# Patient Record
Sex: FEMALE
[De-identification: names, ages, dates, MRNs, and addresses within clinical notes are randomized per-mention and may not be internally consistent; named-entity substitution may affect disease eponyms.]

---

## 2023-01-11 DIAGNOSIS — M25.561 PAIN IN RIGHT KNEE: ICD-10-CM

## 2023-01-11 DIAGNOSIS — M17.11 UNILATERAL PRIMARY OSTEOARTHRITIS, RIGHT KNEE: ICD-10-CM

## 2023-01-11 DIAGNOSIS — G89.29 PAIN IN RIGHT KNEE: ICD-10-CM

## 2023-01-11 PROBLEM — Z00.00 ENCOUNTER FOR PREVENTIVE HEALTH EXAMINATION: Status: ACTIVE | Noted: 2023-01-11

## 2023-01-12 ENCOUNTER — APPOINTMENT (OUTPATIENT)
Dept: RADIOLOGY | Facility: CLINIC | Age: 63
End: 2023-01-12

## 2023-01-12 ENCOUNTER — RESULT REVIEW (OUTPATIENT)
Age: 63
End: 2023-01-12

## 2023-01-12 ENCOUNTER — OUTPATIENT (OUTPATIENT)
Dept: OUTPATIENT SERVICES | Facility: HOSPITAL | Age: 63
LOS: 1 days | End: 2023-01-12
Payer: COMMERCIAL

## 2023-01-12 ENCOUNTER — TRANSCRIPTION ENCOUNTER (OUTPATIENT)
Age: 63
End: 2023-01-12

## 2023-01-12 ENCOUNTER — APPOINTMENT (OUTPATIENT)
Dept: ORTHOPEDIC SURGERY | Facility: CLINIC | Age: 63
End: 2023-01-12
Payer: COMMERCIAL

## 2023-01-12 ENCOUNTER — APPOINTMENT (OUTPATIENT)
Dept: MRI IMAGING | Facility: CLINIC | Age: 63
End: 2023-01-12

## 2023-01-12 DIAGNOSIS — Z82.3 FAMILY HISTORY OF STROKE: ICD-10-CM

## 2023-01-12 DIAGNOSIS — Z82.61 FAMILY HISTORY OF ARTHRITIS: ICD-10-CM

## 2023-01-12 DIAGNOSIS — Z82.69 FAMILY HISTORY OF OTHER DISEASES OF THE MUSCULOSKELETAL SYSTEM AND CONNECTIVE TISSUE: ICD-10-CM

## 2023-01-12 DIAGNOSIS — Z82.49 FAMILY HISTORY OF ISCHEMIC HEART DISEASE AND OTHER DISEASES OF THE CIRCULATORY SYSTEM: ICD-10-CM

## 2023-01-12 DIAGNOSIS — Z78.9 OTHER SPECIFIED HEALTH STATUS: ICD-10-CM

## 2023-01-12 DIAGNOSIS — Z82.62 FAMILY HISTORY OF OSTEOPOROSIS: ICD-10-CM

## 2023-01-12 PROCEDURE — 73564 X-RAY EXAM KNEE 4 OR MORE: CPT | Mod: 26,50,59

## 2023-01-12 PROCEDURE — 73721 MRI JNT OF LWR EXTRE W/O DYE: CPT | Mod: 26,LT

## 2023-01-12 PROCEDURE — 77073 BONE LENGTH STUDIES: CPT | Mod: 26

## 2023-01-12 PROCEDURE — 99205 OFFICE O/P NEW HI 60 MIN: CPT

## 2023-01-13 PROBLEM — Z82.3 FAMILY HISTORY OF CEREBROVASCULAR ACCIDENT (CVA): Status: ACTIVE | Noted: 2023-01-12

## 2023-01-13 PROBLEM — Z82.69 FAMILY HISTORY OF OSTEOARTHRITIS: Status: ACTIVE | Noted: 2023-01-12

## 2023-01-13 PROBLEM — Z82.49 FAMILY HISTORY OF HYPERTENSION: Status: ACTIVE | Noted: 2023-01-12

## 2023-01-13 PROBLEM — Z82.61 FAMILY HISTORY OF RHEUMATOID ARTHRITIS: Status: ACTIVE | Noted: 2023-01-12

## 2023-01-13 PROBLEM — Z82.62 FAMILY HISTORY OF OSTEOPOROSIS: Status: ACTIVE | Noted: 2023-01-12

## 2023-01-13 PROBLEM — Z78.9 NON-SMOKER: Status: ACTIVE | Noted: 2023-01-12

## 2023-01-13 PROBLEM — Z82.49 FAMILY HISTORY OF CARDIAC DISORDER: Status: ACTIVE | Noted: 2023-01-12

## 2023-01-13 NOTE — PHYSICAL EXAM
[de-identified] : Left Knee Exam: \par \par Skin: Normal. No erythema, no ecchymosis, no abrasions, no scratches, no tattoos.  \par                 \par Old Incisions: Healed arthroscopy portals. \par \par Knee Joint Swelling: Positive effusion. Mild to Moderate\par \par Popliteal Swelling: None	\par \par Pre-Patella Bursa Swelling: None. \par \par Alignment: 		        \par Varus, Mild Moderate\par Passively correctable to near neutral.  \par \par Knee Joint Line Tenderness: \par Tender at medial joint line. \par Tender at the lateral joint line.   \par Not tender in the patellofemoral compartment. \par \par Soft Tissue Tenderness: None. \par \par Medial Collateral Ligament Laxity:  Good endpoint.                                                       \par Medial opening to valgus stress.   Moderate.    \par \par Lateral Collateral Ligament Laxity:          \par Normal laxity. Good endpoint. \par \par ROM Extension: 0 degrees.   \par ROM Flexion: 115 degrees.   \par \par ACL Testing: 			\par Stable ACL. Good Endpoint.                                                                \par Lachman Test: Negative \par Anterior Drawer Test: Negative \par \par PCL Testing: 			\par Stable PCL.  Good Endpoint.                                                               \par Posterior Drawer Test: Negative \par \par Patella Compression Test: Negative \par \par Patellofemoral Crepitus: None.   \par \par Patellofemoral Apprehension Testing: Negative. \par \par Patellofemoral Laxity: Normal.\par \par Motor Strength: 			\par Quadriceps strength is 5 out of 5                                                                \par Hamstring strength is 5 out of 5                                                               \par Ankle dorsiflexion strength is 5 out of 5                                                              \par Ankle plantarflexion strength is 5 out of 5 \par \par Sensation: Light tough sensation in the lower extremity is grossly normal.  \par                                    \par Pulses: 				\par Pulses are palpable at the ankle at the Dorsalis Pedis Artery.                                                               \par Pulses are palpable at the Posterior Tibialis Artery.                                                                                                                   \par \par Hip Motion: The patient has full and painless hip range of motion on the left hip. The right hip is painful with motion due to her abductor repair and labral repair surgeries.                                                                                                                 \par \par Lumbar Spine Symptoms: Negative straight leg raise.                                                         \par \par Gait: Limping Gait.  Abnormal gait.  \par \par Assistive Devices: 	Cane in the left hand\par \par \par  [de-identified] : X-ray of the Left Knee:\par \par AP Standing View:\par Medial joint space loss. Severe. Bone on Bone.\par Lateral joint space preserved.\par \par PA Flexion View:\par Medial joint space loss. mild.\par Lateral joint space preserved.\par \par \par Lateral View: \par Patellofemoral joint space preserved.\par \par [place] View: \par Patellofemoral joint space is preserved.\par Patellofemoral joint central tracking.\par \par Bilateral Hip to Ankle Standing View:\par Alignment: Varus left.\par Medial joint space loss on left.\par Hips: No significant changes but there is some sign of work done on the right greater trochanter with no metal but possibly radiolucent suture anchors or bone tunnels.\par

## 2023-01-13 NOTE — DISCUSSION/SUMMARY
[de-identified] : Assessment:\par \par Left Knee:\par -Left knee pain medially\par -Left knee moderate to severe medial compartment osteoarthritis on the AP view, not the PA flexion view.\par -X-rays show moderate to severe bone on bone medial compartment joint space loss in left knee\par -Intact ACL, PCL, mild laxity of MCL\par -No patellofemoral pain or \par - The lateral compartment is tender to touch but not a true Yissel test.\par -Failure of non surgical management for osteoarthritis of the knee.\par -Excellent candidate for Medial Partial Knee Replacement if no tear of the lateral meniscus.\par -patient tripped over a friends dog recently and the lateral pain has been there since, will need to MRI and ensure no lateral meniscus tear is present.\par \par \par \par Plan: \par \par - The patient would like to have a Partial Knee Replacement as soon as possible due to her chronic pain.\par - I recommended and ordered an MRI to make sure the lateral meniscus and cartilage is okay prior to confirming surgery.\par - If not lateral meniscus tear is found, we will proceed with partial knee replacement of the left knee.\par - We have provided our information packet to take home and discuss with the family.\par - The procedure would be Michael Robotic-Arm Assisted, with cemented Restoris MCK implants from [employer].\par - The side, Left \par - Compartment, Medial \par - The patient is discussing surgery scheduling with our coordinator\par \par \par Discussion Partial Knee Replacement:\par \par I had a discussion with the patient regarding the findings of their history, exam and imaging. We discussed the option of Partial Knee Replacement using the precision Michael™ Robotic-Arm System. We discussed the indications, surgical process, my techniques of surgery, the probable post-operative course and instructions, and the risks and benefits of the procedure. The patient had their questions answered to their satisfaction. Although there are no guarantees to success, I feel that the surgery would be very beneficial and there is an excellent chance for a positive outcome.\par \par The risks of this procedure include but are not limited to the risks of anesthesia, heart attack, stroke, respiratory complications, wound healing problems, skin blisters, delayed wound healing, infection, bleeding, nerve damage, vessel damage, skin sensory changes next to the incisions, loss of motion, scar tissue formation requiring further treatment, blood clots, heterotopic bone formation, implant wear, implant loosening after surgery, allergic responses to the implant materials, continued pain despite proper implant placement, soft tissue pain, continued knee swelling, with partial knee replacement there can be progression of arthritis into the other compartments requiring future surgery, and the possible need for further surgery in the future for scar tissue removal, plastic insert change over time, addition of other compartment replacements, and possible revision to total knee replacement.\par \par I look forward to the opportunity to help this patient with their painful knee condition.

## 2023-01-23 DIAGNOSIS — Z01.812 ENCOUNTER FOR PREPROCEDURAL LABORATORY EXAMINATION: ICD-10-CM

## 2023-02-01 ENCOUNTER — APPOINTMENT (OUTPATIENT)
Dept: CT IMAGING | Facility: CLINIC | Age: 63
End: 2023-02-01

## 2023-02-01 ENCOUNTER — OUTPATIENT (OUTPATIENT)
Dept: OUTPATIENT SERVICES | Facility: HOSPITAL | Age: 63
LOS: 1 days | End: 2023-02-01
Payer: COMMERCIAL

## 2023-02-01 ENCOUNTER — LABORATORY RESULT (OUTPATIENT)
Age: 63
End: 2023-02-01

## 2023-02-01 ENCOUNTER — APPOINTMENT (OUTPATIENT)
Dept: ORTHOPEDIC SURGERY | Facility: CLINIC | Age: 63
End: 2023-02-01
Payer: COMMERCIAL

## 2023-02-01 VITALS — WEIGHT: 136 LBS | HEIGHT: 64 IN | BODY MASS INDEX: 23.22 KG/M2

## 2023-02-01 DIAGNOSIS — Z87.19 PERSONAL HISTORY OF OTHER DISEASES OF THE DIGESTIVE SYSTEM: ICD-10-CM

## 2023-02-01 DIAGNOSIS — Z85.3 PERSONAL HISTORY OF MALIGNANT NEOPLASM OF BREAST: ICD-10-CM

## 2023-02-01 DIAGNOSIS — M17.12 UNILATERAL PRIMARY OSTEOARTHRITIS, LEFT KNEE: ICD-10-CM

## 2023-02-01 DIAGNOSIS — Z87.39 PERSONAL HISTORY OF OTHER DISEASES OF THE MUSCULOSKELETAL SYSTEM AND CONNECTIVE TISSUE: ICD-10-CM

## 2023-02-01 PROCEDURE — 99214 OFFICE O/P EST MOD 30 MIN: CPT

## 2023-02-01 PROCEDURE — 73700 CT LOWER EXTREMITY W/O DYE: CPT | Mod: 26,LT

## 2023-02-01 PROCEDURE — 73700 CT LOWER EXTREMITY W/O DYE: CPT

## 2023-02-02 LAB
25(OH)D3 SERPL-MCNC: 33.3 NG/ML
ALBUMIN SERPL ELPH-MCNC: 4.4 G/DL
ALP BLD-CCNC: 78 U/L
ALT SERPL-CCNC: 26 U/L
ANION GAP SERPL CALC-SCNC: 10 MMOL/L
APPEARANCE: CLEAR
APTT BLD: 32.7 SEC
AST SERPL-CCNC: 23 U/L
BACTERIA: NEGATIVE
BASOPHILS # BLD AUTO: 0.04 K/UL
BASOPHILS NFR BLD AUTO: 0.7 %
BILIRUB SERPL-MCNC: 0.3 MG/DL
BILIRUBIN URINE: NEGATIVE
BLOOD URINE: NEGATIVE
BUN SERPL-MCNC: 17 MG/DL
CALCIUM SERPL-MCNC: 10 MG/DL
CHLORIDE SERPL-SCNC: 106 MMOL/L
CO2 SERPL-SCNC: 26 MMOL/L
COLOR: COLORLESS
CREAT SERPL-MCNC: 0.85 MG/DL
EGFR: 77 ML/MIN/1.73M2
EOSINOPHIL # BLD AUTO: 0.23 K/UL
EOSINOPHIL NFR BLD AUTO: 4.2 %
FERRITIN SERPL-MCNC: 154 NG/ML
GLUCOSE QUALITATIVE U: NEGATIVE
GLUCOSE SERPL-MCNC: 95 MG/DL
HCG SERPL-MCNC: 3 MIU/ML
HCT VFR BLD CALC: 42.4 %
HGB BLD-MCNC: 13.7 G/DL
HYALINE CASTS: 0 /LPF
IMM GRANULOCYTES NFR BLD AUTO: 0.2 %
INR PPP: 0.94 RATIO
IRON SATN MFR SERPL: 16 %
IRON SERPL-MCNC: 48 UG/DL
KETONES URINE: NEGATIVE
LEUKOCYTE ESTERASE URINE: NEGATIVE
LYMPHOCYTES # BLD AUTO: 1.87 K/UL
LYMPHOCYTES NFR BLD AUTO: 34 %
MAN DIFF?: NORMAL
MCHC RBC-ENTMCNC: 29.1 PG
MCHC RBC-ENTMCNC: 32.3 GM/DL
MCV RBC AUTO: 90.2 FL
MICROSCOPIC-UA: NORMAL
MONOCYTES # BLD AUTO: 0.37 K/UL
MONOCYTES NFR BLD AUTO: 6.7 %
MRSA SPEC QL CULT: NOT DETECTED
NEUTROPHILS # BLD AUTO: 2.98 K/UL
NEUTROPHILS NFR BLD AUTO: 54.2 %
NITRITE URINE: NEGATIVE
PH URINE: 5.5
PLATELET # BLD AUTO: 399 K/UL
POTASSIUM SERPL-SCNC: 4.5 MMOL/L
PROT SERPL-MCNC: 6.8 G/DL
PROTEIN URINE: NEGATIVE
PT BLD: 11.1 SEC
RBC # BLD: 4.7 M/UL
RBC # FLD: 12.7 %
RED BLOOD CELLS URINE: 0 /HPF
SODIUM SERPL-SCNC: 142 MMOL/L
SPECIFIC GRAVITY URINE: 1
SQUAMOUS EPITHELIAL CELLS: 0 /HPF
STAPH AUREUS (SA): NOT DETECTED
TIBC SERPL-MCNC: 296 UG/DL
TRANSFERRIN SERPL-MCNC: 239 MG/DL
UIBC SERPL-MCNC: 248 UG/DL
UROBILINOGEN URINE: NORMAL
WBC # FLD AUTO: 5.5 K/UL
WHITE BLOOD CELLS URINE: 1 /HPF

## 2023-02-03 LAB — SARS-COV-2 N GENE NPH QL NAA+PROBE: NOT DETECTED

## 2023-02-07 ENCOUNTER — APPOINTMENT (OUTPATIENT)
Age: 63
End: 2023-02-07
Payer: COMMERCIAL

## 2023-02-07 PROCEDURE — 27446 REVISION OF KNEE JOINT: CPT | Mod: LT

## 2023-02-08 ENCOUNTER — APPOINTMENT (OUTPATIENT)
Dept: ORTHOPEDIC SURGERY | Facility: CLINIC | Age: 63
End: 2023-02-08
Payer: COMMERCIAL

## 2023-02-08 ENCOUNTER — RESULT REVIEW (OUTPATIENT)
Age: 63
End: 2023-02-08

## 2023-02-08 ENCOUNTER — OUTPATIENT (OUTPATIENT)
Dept: OUTPATIENT SERVICES | Facility: HOSPITAL | Age: 63
LOS: 1 days | End: 2023-02-08
Payer: COMMERCIAL

## 2023-02-08 VITALS — WEIGHT: 136 LBS | HEIGHT: 64 IN | BODY MASS INDEX: 23.22 KG/M2

## 2023-02-08 PROCEDURE — 73562 X-RAY EXAM OF KNEE 3: CPT | Mod: 26,LT

## 2023-02-08 PROCEDURE — 77073 BONE LENGTH STUDIES: CPT | Mod: 26

## 2023-02-08 PROCEDURE — 73562 X-RAY EXAM OF KNEE 3: CPT

## 2023-02-08 PROCEDURE — 99024 POSTOP FOLLOW-UP VISIT: CPT

## 2023-02-08 PROCEDURE — 77073 BONE LENGTH STUDIES: CPT

## 2023-03-01 ENCOUNTER — APPOINTMENT (OUTPATIENT)
Dept: ORTHOPEDIC SURGERY | Facility: CLINIC | Age: 63
End: 2023-03-01
Payer: COMMERCIAL

## 2023-03-01 VITALS — HEIGHT: 64 IN | BODY MASS INDEX: 23.22 KG/M2 | WEIGHT: 136 LBS

## 2023-03-01 DIAGNOSIS — G89.29 PAIN IN LEFT KNEE: ICD-10-CM

## 2023-03-01 DIAGNOSIS — M54.42 LUMBAGO WITH SCIATICA, LEFT SIDE: ICD-10-CM

## 2023-03-01 DIAGNOSIS — M54.32 SCIATICA, LEFT SIDE: ICD-10-CM

## 2023-03-01 DIAGNOSIS — S83.412A SPRAIN OF MEDIAL COLLATERAL LIGAMENT OF LEFT KNEE, INITIAL ENCOUNTER: ICD-10-CM

## 2023-03-01 DIAGNOSIS — M70.61 TROCHANTERIC BURSITIS, RIGHT HIP: ICD-10-CM

## 2023-03-01 DIAGNOSIS — M70.62 TROCHANTERIC BURSITIS, RIGHT HIP: ICD-10-CM

## 2023-03-01 DIAGNOSIS — M25.562 PAIN IN LEFT KNEE: ICD-10-CM

## 2023-03-01 PROCEDURE — 99213 OFFICE O/P EST LOW 20 MIN: CPT | Mod: 24

## 2023-03-01 RX ORDER — LIDOCAINE 5% 700 MG/1
5 PATCH TOPICAL
Qty: 30 | Refills: 1 | Status: ACTIVE | COMMUNITY
Start: 2023-03-01 | End: 1900-01-01

## 2023-03-01 RX ORDER — CEPHALEXIN 500 MG/1
500 CAPSULE ORAL 4 TIMES DAILY
Qty: 20 | Refills: 1 | Status: COMPLETED | COMMUNITY
Start: 2023-02-06 | End: 2023-02-12

## 2023-03-01 RX ADMIN — METHYLPREDNISOLONE 21 MG: 4 TABLET ORAL at 00:00

## 2023-03-01 NOTE — HISTORY OF PRESENT ILLNESS
[de-identified] : Angelic is 3 weeks s/p left medial partial knee replacement.\par She is complaining of left sided buttock and left lower extremity pain, low back pain, bilateral trochanteric bursitis, decreased sensation in the medial lower leg and front of ankle, burning pain on the lateral thigh, and pain along the MCL of the left knee. [de-identified] : The left knee incisions are healing nicely. No erythema, no ecchymosis, no joint swelling, no calf tenderness. \par The MCL and LCL are stable to stress.\par The ACL and PCL have good endpoints.\par The ROM is 0-110 today.\par There is no leg swelling.\par There is severe bilateral trochanteric bursa tenderness. \par There is lateral thigh sharp and burning pain.\par There is left uppper buttock pain.\par There is low back pain.\par Ther is numbness over the medial lower leg not the medial thigh.\par Pulses are palpable.\par motor exam: 5/5 knee and ankle flexion and extension.\par She is walking with a cane. [de-identified] : -3 weeks s/p left medial partial knee replacement.\par -low back pain with left lower extremity radiculopathy.\par -MCL tenderness\par -bilateral trochanteric bursitis severe.\par  [de-identified] : -I have prescribed a medrol dose pack to help with her global inflammation and to help with her left leg radiculopathy symptoms.\par -I have ordered an MRI of her LS spine to look for a nerve impingement or disc herniation that may be causing this acute flare.\par -She will be caring for her daughters 2 month old child for a while and recoomended using a lumbar support brace during the day to help her back stability.\par She should continue her left knee PT.\par I will followup with her on the MRI once completed and see her back in 2 weeks.

## 2023-03-06 ENCOUNTER — APPOINTMENT (OUTPATIENT)
Dept: ORTHOPEDIC SURGERY | Facility: CLINIC | Age: 63
End: 2023-03-06
Payer: COMMERCIAL

## 2023-03-06 ENCOUNTER — OUTPATIENT (OUTPATIENT)
Dept: OUTPATIENT SERVICES | Facility: HOSPITAL | Age: 63
LOS: 1 days | End: 2023-03-06
Payer: COMMERCIAL

## 2023-03-06 ENCOUNTER — RESULT REVIEW (OUTPATIENT)
Age: 63
End: 2023-03-06

## 2023-03-06 VITALS
BODY MASS INDEX: 23.22 KG/M2 | DIASTOLIC BLOOD PRESSURE: 95 MMHG | WEIGHT: 136 LBS | SYSTOLIC BLOOD PRESSURE: 172 MMHG | OXYGEN SATURATION: 96 % | HEART RATE: 69 BPM | HEIGHT: 64 IN

## 2023-03-06 DIAGNOSIS — M41.9 SCOLIOSIS, UNSPECIFIED: ICD-10-CM

## 2023-03-06 DIAGNOSIS — M25.562 PAIN IN LEFT KNEE: ICD-10-CM

## 2023-03-06 PROCEDURE — 99215 OFFICE O/P EST HI 40 MIN: CPT | Mod: 24

## 2023-03-06 PROCEDURE — 72110 X-RAY EXAM L-2 SPINE 4/>VWS: CPT

## 2023-03-06 PROCEDURE — 72110 X-RAY EXAM L-2 SPINE 4/>VWS: CPT | Mod: 26

## 2023-03-07 PROBLEM — M25.562 LEFT MEDIAL KNEE PAIN: Status: ACTIVE | Noted: 2023-01-13

## 2023-03-07 PROBLEM — M41.9 LUMBAR SCOLIOSIS: Status: ACTIVE | Noted: 2023-03-07

## 2023-03-08 NOTE — HISTORY OF PRESENT ILLNESS
[de-identified] : 62-year-old female presents as new patient referred by Dr. Alaniz for evaluation of low back and left lower extremity pain.  4 weeks ago she underwent a unicompartmental arthroplasty of the left knee with Dr. Alaniz and states that since the procedure has had left-sided back pain and severe left knee pain.  She denies any clear radiation from the lumbar spine into the lower extremity though states it is difficult to say given recent surgery.  She has tried anti-inflammatories as well as oxycodone but cannot seem to find a balance between side effects and pain relief.  Describes some numbness in the anterior shin since knee surgery, denies any radiation of numbness tingling or weakness into the toes.  Denies any right-sided radicular symptoms.  Has a history of multiple right hip surgeries including arthroscopic labral repair as well as an open right abductor repair at [facility].  Denies any history of low back pain or radicular symptoms.

## 2023-03-08 NOTE — PHYSICAL EXAM
[Antalgic] : antalgic [Cane] : ambulates with cane [UE/LE] : Sensory: Intact in bilateral upper & lower extremities [Normal Touch] : sensation intact for touch [Normal] : No costovertebral angle tenderness and no spinal tenderness [de-identified] : Antalgic favoring left knee [de-identified] : X-ray 4 view lumbar 3/6/2023 care stream:\par Levoscoliosis of the lumbar spine approximately 21 degrees between L4 and T12.  There is a mild rotational deformity associated.  Physiologic maintenance of active flexion extension.  No evidence of instability or disc base degeneration.\par \par MRI lumbar spine 3/3/2023 [facility]:\par Disc base heights are globally maintained.  There is minimal loss of signal at L3-4 L4-5 and L5-S1.  At L5-S1 there is a minimal central disc bulge without foraminal or central stenosis.  At L4-5 there is a left-sided lateral disc bulge resulting in some minor lateral recess and foraminal stenosis which may be impinging the exiting Left L4 nerve root.  The supra adjacent levels are without disc pathology nor stenosis.

## 2023-03-08 NOTE — DISCUSSION/SUMMARY
[de-identified] : Thorough conversation was held the patient regarding her condition and treatment options risk benefits and alternatives.  I have explained that the predominance of her pain does appear to be resulting from the left knee; her procedure appears to have gone very well with appropriate healing and her pain does seem somewhat disproportionate given the time since this procedure.  Her acute left-sided low back pain since waking from the procedure may be secondary to a positional strain or injury.  Given the extent of her discomfort have recommended epidural steroid injection at the L4-5 level.  There is a small left-sided disc bulge in the setting of her scoliosis which does appear chronic in without significant stenosis however in this acute phase may be resulting in a left lower extremity radiculopathy concomitant with her postoperative left knee pain.  I also prescribed tramadol for relief due to the extent of her discomfort\par \par I have spent greater than 60 minutes preparing to see the patient, collecting relevant history, performing a thorough history and physical examination, counseling the patient regarding my findings ordering the appropriate therapies and tests, communicating with other relevant healthcare professionals, documenting my encounter and coordinating care.\par

## 2023-03-08 NOTE — ASSESSMENT
[FreeTextEntry1] : 62-year-old female 4 weeks status post left unicompartmental knee replacement with acute left-sided low back and knee pain with anterior shin numbness since the procedure

## 2023-03-20 ENCOUNTER — APPOINTMENT (OUTPATIENT)
Dept: ORTHOPEDIC SURGERY | Facility: CLINIC | Age: 63
End: 2023-03-20
Payer: COMMERCIAL

## 2023-03-20 ENCOUNTER — OUTPATIENT (OUTPATIENT)
Dept: OUTPATIENT SERVICES | Facility: HOSPITAL | Age: 63
LOS: 1 days | End: 2023-03-20
Payer: COMMERCIAL

## 2023-03-20 ENCOUNTER — APPOINTMENT (OUTPATIENT)
Dept: RADIOLOGY | Facility: CLINIC | Age: 63
End: 2023-03-20

## 2023-03-20 VITALS
HEART RATE: 81 BPM | WEIGHT: 136 LBS | BODY MASS INDEX: 23.22 KG/M2 | OXYGEN SATURATION: 98 % | HEIGHT: 64 IN | RESPIRATION RATE: 16 BRPM | DIASTOLIC BLOOD PRESSURE: 90 MMHG | SYSTOLIC BLOOD PRESSURE: 126 MMHG

## 2023-03-20 DIAGNOSIS — S83.412D SPRAIN OF MEDIAL COLLATERAL LIGAMENT OF LEFT KNEE, SUBSEQUENT ENCOUNTER: ICD-10-CM

## 2023-03-20 PROCEDURE — 20610 DRAIN/INJ JOINT/BURSA W/O US: CPT | Mod: 79,LT

## 2023-03-20 PROCEDURE — 99213 OFFICE O/P EST LOW 20 MIN: CPT | Mod: 24,25

## 2023-03-20 PROCEDURE — 73562 X-RAY EXAM OF KNEE 3: CPT | Mod: 26,LT

## 2023-03-20 PROCEDURE — 77073 BONE LENGTH STUDIES: CPT | Mod: 26

## 2023-03-20 PROCEDURE — 73562 X-RAY EXAM OF KNEE 3: CPT

## 2023-03-20 PROCEDURE — 77073 BONE LENGTH STUDIES: CPT

## 2023-03-20 NOTE — HISTORY OF PRESENT ILLNESS
[de-identified] : Angelic  is a 62 year  old female who presents today for a post op visit.\par \par Post op Day: 6 weeks\par Surgery Type:  partial knee replacement \par Side of Surgery: Left knee medial\par Date of Surgery: 02/07/2023\par Satisfaction Level:  Satisfied\par  [de-identified] : Angelic is now 6 weeks out from her left medial partial knee replacement.  She has been struggling with low back pain and radiating pain into the lower extremities as well as some neck pain.  She has been seen by Dr. Chatman who has been helping her with planning of some of the interventions including an epidural that she will be getting tomorrow.  She says her left knee is not painful when she is walking on it now at nighttime she has pain on the medial side of the knee when she touches her knees together which is near her MCL.  She reminds me today that this is the same area that her before her partial knee replacement surgery when she was at a friend's and tripped over a dog and she hurt her MCL of her left knee and her right shoulder at that time.  So this is most likely still a resolving medial collateral ligament sprain that was around from prior to her surgery and continues on today.  Her most significant issue with her knee is still the MCL and only when touching it not when walking.  She is walking without the use of an assistive device at this time. [de-identified] : Left knee exam:\par \par Full extension and flexes to 125 degrees\par Stable to varus and valgus stress testing with no instability.\par Anterior and posterior drawer testing is stable with intact ACL and PCL.\par There is no significant knee effusion today.\par She has normal-appearing skin and her incisions are healing beautifully.\par She does have bilateral trochanteric bursa tenderness.  And she does have low back tenderness.\par Her gait appears normal with no limping at this time.\par She is still quite tender at the midportion of her medial collateral ligament.  The medial collateral ligament is stable however to stress testing with no significant opening on valgus stress [de-identified] : X-rays today of the left knee:\par Medial partial knee replacement in excellent position with central tracking of the implants and no signs of loosening.  Lateral compartment and patellofemoral compartments are well-preserved in the left knee. [de-identified] : -6-week status post left medial compartment partial knee replacement.\par -Mild medial collateral ligament tenderness in the mid substance from a fall prior to surgery.\par -Normal gait and no knee pain with weightbearing.\par -Difficulty with sleeping at night due to her medial collateral ligament tenderness as well as her significant low back and neck pain. [de-identified] : After discussing the risks and benefits of doing a corticosteroid and local anesthetic injection into her medial collateral ligament, she agreed to proceed with a injection into the MCL of the left knee.\par \par MCL injection, Left : Verbal consent was obtained from the patient for a left knee injection after the risks and benefits were discussed. The patient was made comfortable in the seated position with the knee at 90 degrees hanging over the table. The patient’s knee injection site was then sterilely prepped. Local anesthetic was used to desensitize the skin prior to the injection. A sterile 25 -guage needle on a sterile syringe was used to introduce the injectable liquid into the point of maximum tenderness at the midportion of the MCL and the needle was moved subcutaneously spreading out the medication throughout the area proximally and distally. The needle was withdrawn, and a sterile band-aid was placed over the injection site. Pressure was applied to the injection site for several minutes to help with hemostasis as the injection site. The patient tolerated the procedure well.\par \par Medications injected into the knee:\par a. Steroid: Depomedrol 40mg.\par b. Local anesthetic: Lidocaine1%, Marcaine 0.25%.\par \par She may ice the knee today.\par It may take up to 2 weeks for this to take full effect.\par She is getting a epidural back injection tomorrow by her mom's Pain management doctor.

## 2023-05-02 RX ORDER — TRAMADOL HYDROCHLORIDE 50 MG/1
50 TABLET, COATED ORAL 3 TIMES DAILY
Qty: 40 | Refills: 1 | Status: DISCONTINUED | COMMUNITY
Start: 2023-03-06 | End: 2023-05-02

## 2023-05-02 RX ORDER — HYDROCODONE BITARTRATE AND ACETAMINOPHEN 7.5; 325 MG/1; MG/1
7.5-325 TABLET ORAL
Qty: 24 | Refills: 0 | Status: DISCONTINUED | COMMUNITY
Start: 2023-02-06 | End: 2023-05-02

## 2023-05-08 ENCOUNTER — APPOINTMENT (OUTPATIENT)
Dept: ORTHOPEDIC SURGERY | Facility: CLINIC | Age: 63
End: 2023-05-08
Payer: COMMERCIAL

## 2023-05-08 ENCOUNTER — OUTPATIENT (OUTPATIENT)
Dept: OUTPATIENT SERVICES | Facility: HOSPITAL | Age: 63
LOS: 1 days | End: 2023-05-08
Payer: COMMERCIAL

## 2023-05-08 VITALS
DIASTOLIC BLOOD PRESSURE: 87 MMHG | SYSTOLIC BLOOD PRESSURE: 138 MMHG | BODY MASS INDEX: 23.22 KG/M2 | TEMPERATURE: 97.3 F | HEIGHT: 64 IN | RESPIRATION RATE: 18 BRPM | OXYGEN SATURATION: 99 % | HEART RATE: 66 BPM | WEIGHT: 136 LBS

## 2023-05-08 PROCEDURE — 99213 OFFICE O/P EST LOW 20 MIN: CPT | Mod: 24

## 2023-05-08 PROCEDURE — 77073 BONE LENGTH STUDIES: CPT | Mod: 26

## 2023-05-08 PROCEDURE — 77073 BONE LENGTH STUDIES: CPT

## 2023-05-08 PROCEDURE — 73562 X-RAY EXAM OF KNEE 3: CPT

## 2023-05-08 PROCEDURE — 73562 X-RAY EXAM OF KNEE 3: CPT | Mod: 26,LT

## 2023-05-12 NOTE — PHYSICAL EXAM
[de-identified] : Left Knee Exam: \par \par Skin: Normal. No erythema, no ecchymosis, no abrasions, no scratches, no tattoos.  \par                 \par Old Incisions: Healed incisions. Thin incision line.\par \par Knee Joint Swelling: No effusion today.\par \par Popliteal Swelling: None	\par \par Pre-Patella Bursa Swelling: None. \par \par Alignment: 		        \par neutral and stable.\par \par Knee Joint Line Tenderness: \par \par Not tender around the knee today.\par \par Soft Tissue Tenderness: None. \par \par Medial Collateral Ligament Laxity:  Good endpoint.                                                       \par Medial opening to valgus stress.   none.\par \par Lateral Collateral Ligament Laxity:          \par Normal laxity. Good endpoint. \par \par ROM Extension: 0 degrees.   \par ROM Flexion: 120 degrees.   \par \par ACL Testing: 			\par Stable ACL. Good Endpoint.                                                                \par Lachman Test: Negative \par Anterior Drawer Test: Negative \par \par PCL Testing: 			\par Stable PCL.  Good Endpoint.                                                               \par Posterior Drawer Test: Negative \par \par Patella Compression Test: Negative \par \par Patellofemoral Crepitus: None.   \par \par Patellofemoral Apprehension Testing: Negative. \par \par Patellofemoral Laxity: Normal.\par \par Motor Strength: 			\par Quadriceps strength is 5 out of 5                                                                \par Hamstring strength is 5 out of 5                                                               \par Ankle dorsiflexion strength is 5 out of 5                                                              \par Ankle plantarflexion strength is 5 out of 5 \par \par Sensation: Light tough sensation in the lower extremity is grossly normal.  \par                                    \par Pulses: 				\par Pulses are palpable at the ankle at the Dorsalis Pedis Artery.                                                               \par Pulses are palpable at the Posterior Tibialis Artery.                                                                                                                   \par \par Gait: Normal no limp today.\par \par Assistive Devices: 	None.\par \par \par  [de-identified] : Post Op  X-Rays Partial Knee:\par \par Examination of the Knee: Left \par \par Views: AP, Lateral, Merchant, Hip to Ankle \par \par \par AP Standing View:\par Medial Partial Knee Replacement in good position.\par No signs of Loosening.\par No subsidence.\par No fracture.\par Good insert space thickness.\par \par Lateral View: \par Medial  Partial  Knee Replacement in good position.\par Fix is anatomic on both femur and tibia.\par \par [place] View: \par Patellofemoral joint shows central tracking.\par Medial implant tracking is central on the tibial implant.\par \par Bilateral Hip to Ankle Standing View:\par Knee Alignment is 3 degrees varus on both knees today.

## 2023-05-12 NOTE — HISTORY OF PRESENT ILLNESS
[de-identified] : Angelic is now 12 weeks and 6 days out from her left medial partial knee replacement. She reports some pain with certain movements such as twisting, but her pain is now much improved and she feels much better walking on her left knee. She is still experiencing low back pain and now radiation into the front of the right thigh. She has been doing her exercises and PT.\par Today was the best I have seen her walk since i met her, and she is walking normally when she was walking in our hallways.

## 2023-05-12 NOTE — DISCUSSION/SUMMARY
[de-identified] : Assessment:\par \par - Post Op 3 Months after Left Medial Partial Knee Replacements, Michael Robotic-Assisted.\par - Cemented components\par - Incision clean and dry with no drainage. \par - Incisions well Healed.\par - No signs of infection.\par - No calf tenderness. No signs of DVT.\par - Pain well controlled. Not taking narcotics.\par - Gait is very good.\par - Patient has now joining a gym to continue her workouts. We discussed closed chain vs open chain quad exercises.\par - Range of Motion is excellent.\par - Stable collateral ligament exam and cruciate ligament exam. \par - Patient is very satisfied with their progress and early outcome today and will continue to work on improving strength and flexibility.\par \par \par Plan:\par \par - Continue to work on stretching and strengthening of surgical leg.\par - Continue with a life long daily home exercise or fitness center program.\par - Follow-up 6 months after date of surgery with X-rays. Bilateral Standing

## 2023-05-12 NOTE — REASON FOR VISIT
[Post-Operative Visit] : a post-operative visit for [Partial Knee Replacement Surgery, Medial, Aftercare] : partial knee replacement surgery, medial, aftercare [FreeTextEntry2] : 12 weeks and 6 days s/p Left Partial Medial Knee Replacement.

## 2023-07-07 ENCOUNTER — OFFICE (OUTPATIENT)
Dept: URBAN - METROPOLITAN AREA CLINIC 28 | Facility: CLINIC | Age: 63
Setting detail: OPHTHALMOLOGY
End: 2023-07-07
Payer: COMMERCIAL

## 2023-07-07 ENCOUNTER — RX ONLY (RX ONLY)
Age: 63
End: 2023-07-07

## 2023-07-07 DIAGNOSIS — H25.11: ICD-10-CM

## 2023-07-07 DIAGNOSIS — H16.223: ICD-10-CM

## 2023-07-07 DIAGNOSIS — H02.402: ICD-10-CM

## 2023-07-07 PROCEDURE — 92014 COMPRE OPH EXAM EST PT 1/>: CPT | Performed by: SPECIALIST

## 2023-07-07 PROCEDURE — 92136 OPHTHALMIC BIOMETRY: CPT | Performed by: SPECIALIST

## 2023-07-07 ASSESSMENT — KERATOMETRY
OS_CYLPOWER_DEGREES: 0.5
OD_AXISANGLE2_DEGREES: 90
OS_CYLAXISANGLE_DEGREES: 094
OS_K1K2_AVERAGE: 44.25
OS_K1POWER_DIOPTERS: 44.00
OS_K2POWER_DIOPTERS: 44.50
OS_AXISANGLE2_DEGREES: 094
OS_AXISANGLE_DEGREES: 4

## 2023-07-07 ASSESSMENT — CONFRONTATIONAL VISUAL FIELD TEST (CVF)
OS_FINDINGS: FULL
OD_FINDINGS: FULL

## 2023-07-07 ASSESSMENT — LID EXAM ASSESSMENTS
OS_MEIBOMITIS: LLL LUL 1+ 2+
OD_MEIBOMITIS: RLL RUL 1+ 2+

## 2023-07-07 ASSESSMENT — TONOMETRY
OS_IOP_MMHG: 15
OD_IOP_MMHG: 17

## 2023-07-07 ASSESSMENT — SUPERFICIAL PUNCTATE KERATITIS (SPK)
OD_SPK: 1+
OS_SPK: 1+ 2+

## 2023-07-07 ASSESSMENT — LID POSITION - PTOSIS: OS_PTOSIS: LUL 1+

## 2023-07-10 ASSESSMENT — REFRACTION_AUTOREFRACTION
OS_CYLINDER: 0.00
OS_SPHERE: -0.25
OD_SPHERE: UNABLE
OS_AXIS: 000

## 2023-07-10 ASSESSMENT — AXIALLENGTH_DERIVED: OS_AL: 23.4152

## 2023-07-10 ASSESSMENT — KERATOMETRY
OS_AXISANGLE_DEGREES: 094
OS_K2POWER_DIOPTERS: 44.50
OS_K1POWER_DIOPTERS: 44.00
OD_K1POWER_DIOPTERS: UNABLE
METHOD_AUTO_MANUAL: AUTO

## 2023-07-10 ASSESSMENT — VISUAL ACUITY
OD_BCVA: 20/20
OS_BCVA: 20/150

## 2023-07-10 ASSESSMENT — SPHEQUIV_DERIVED: OS_SPHEQUIV: -0.25

## 2023-08-07 ENCOUNTER — APPOINTMENT (OUTPATIENT)
Dept: ORTHOPEDIC SURGERY | Facility: CLINIC | Age: 63
End: 2023-08-07

## 2023-09-11 ENCOUNTER — RX ONLY (RX ONLY)
Age: 63
End: 2023-09-11

## 2023-09-18 ENCOUNTER — AMBULATORY SURGERY CENTER (OUTPATIENT)
Dept: URBAN - METROPOLITAN AREA SURGERY 23 | Facility: SURGERY | Age: 63
Setting detail: OPHTHALMOLOGY
End: 2023-09-18
Payer: COMMERCIAL

## 2023-09-18 DIAGNOSIS — H25.11: ICD-10-CM

## 2023-09-18 DIAGNOSIS — H52.211: ICD-10-CM

## 2023-09-18 PROCEDURE — 66984 XCAPSL CTRC RMVL W/O ECP: CPT | Performed by: SPECIALIST

## 2023-09-18 PROCEDURE — V2787 ASTIGMATISM-CORRECT FUNCTION: HCPCS | Performed by: SPECIALIST

## 2023-09-18 PROCEDURE — FEMTO FEMTOSECOND LASER: Performed by: SPECIALIST

## 2023-09-19 ENCOUNTER — OFFICE (OUTPATIENT)
Dept: URBAN - METROPOLITAN AREA CLINIC 28 | Facility: CLINIC | Age: 63
Setting detail: OPHTHALMOLOGY
End: 2023-09-19
Payer: COMMERCIAL

## 2023-09-19 DIAGNOSIS — Z96.1: ICD-10-CM

## 2023-09-19 PROCEDURE — 99024 POSTOP FOLLOW-UP VISIT: CPT | Performed by: SPECIALIST

## 2023-09-19 ASSESSMENT — REFRACTION_AUTOREFRACTION
OS_AXIS: 000
OD_SPHERE: UNABLE
OS_CYLINDER: 0.00
OS_SPHERE: -0.25

## 2023-09-19 ASSESSMENT — SUPERFICIAL PUNCTATE KERATITIS (SPK)
OS_SPK: 1+ 2+
OD_SPK: 1+

## 2023-09-19 ASSESSMENT — VISUAL ACUITY
OS_BCVA: 20/25-2
OD_BCVA: 20/20

## 2023-09-19 ASSESSMENT — AXIALLENGTH_DERIVED: OS_AL: 23.4152

## 2023-09-19 ASSESSMENT — KERATOMETRY
OS_K2POWER_DIOPTERS: 44.50
METHOD_AUTO_MANUAL: AUTO
OS_K1POWER_DIOPTERS: 44.00
OD_K1POWER_DIOPTERS: UNABLE
OS_AXISANGLE_DEGREES: 094

## 2023-09-19 ASSESSMENT — LID POSITION - PTOSIS: OS_PTOSIS: LUL 1+

## 2023-09-19 ASSESSMENT — CONFRONTATIONAL VISUAL FIELD TEST (CVF)
OD_FINDINGS: FULL
OS_FINDINGS: FULL

## 2023-09-19 ASSESSMENT — LID EXAM ASSESSMENTS
OD_MEIBOMITIS: RLL RUL 1+ 2+
OS_MEIBOMITIS: LLL LUL 1+ 2+

## 2023-09-19 ASSESSMENT — SPHEQUIV_DERIVED: OS_SPHEQUIV: -0.25

## 2023-09-29 ENCOUNTER — OFFICE (OUTPATIENT)
Dept: URBAN - METROPOLITAN AREA CLINIC 28 | Facility: CLINIC | Age: 63
Setting detail: OPHTHALMOLOGY
End: 2023-09-29
Payer: COMMERCIAL

## 2023-09-29 DIAGNOSIS — Z96.1: ICD-10-CM

## 2023-09-29 PROCEDURE — 99024 POSTOP FOLLOW-UP VISIT: CPT | Performed by: SPECIALIST

## 2023-09-29 ASSESSMENT — LID EXAM ASSESSMENTS
OD_MEIBOMITIS: RLL RUL 1+ 2+
OS_MEIBOMITIS: LLL LUL 1+ 2+

## 2023-09-29 ASSESSMENT — LID POSITION - PTOSIS: OS_PTOSIS: LUL 1+

## 2023-09-29 ASSESSMENT — KERATOMETRY
OD_AXISANGLE_DEGREES: 078
OS_AXISANGLE_DEGREES: 113
OD_K2POWER_DIOPTERS: 44.25
OS_K2POWER_DIOPTERS: 44.50
METHOD_AUTO_MANUAL: AUTO
OD_K1POWER_DIOPTERS: 43.50
OS_K1POWER_DIOPTERS: 44.00

## 2023-09-29 ASSESSMENT — REFRACTION_AUTOREFRACTION
OD_AXIS: 159
OS_CYLINDER: -0.25
OD_SPHERE: +0.50
OS_AXIS: 028
OD_CYLINDER: -0.75
OS_SPHERE: -0.25

## 2023-09-29 ASSESSMENT — AXIALLENGTH_DERIVED
OS_AL: 23.4632
OD_AL: 23.4069

## 2023-09-29 ASSESSMENT — SPHEQUIV_DERIVED
OD_SPHEQUIV: 0.125
OS_SPHEQUIV: -0.375

## 2023-09-29 ASSESSMENT — SUPERFICIAL PUNCTATE KERATITIS (SPK)
OD_SPK: 1+
OS_SPK: 1+ 2+

## 2023-09-29 ASSESSMENT — VISUAL ACUITY
OS_BCVA: 20/25+
OD_BCVA: 20/20

## 2024-03-25 ENCOUNTER — APPOINTMENT (OUTPATIENT)
Dept: ORTHOPEDIC SURGERY | Facility: CLINIC | Age: 64
End: 2024-03-25
Payer: COMMERCIAL

## 2024-03-25 ENCOUNTER — OUTPATIENT (OUTPATIENT)
Dept: OUTPATIENT SERVICES | Facility: HOSPITAL | Age: 64
LOS: 1 days | End: 2024-03-25
Payer: COMMERCIAL

## 2024-03-25 VITALS
OXYGEN SATURATION: 99 % | DIASTOLIC BLOOD PRESSURE: 88 MMHG | HEIGHT: 64 IN | SYSTOLIC BLOOD PRESSURE: 135 MMHG | WEIGHT: 137 LBS | RESPIRATION RATE: 18 BRPM | BODY MASS INDEX: 23.39 KG/M2 | HEART RATE: 73 BPM

## 2024-03-25 DIAGNOSIS — Z47.1 AFTERCARE FOLLOWING JOINT REPLACEMENT SURGERY: ICD-10-CM

## 2024-03-25 DIAGNOSIS — M75.81 OTHER SHOULDER LESIONS, RIGHT SHOULDER: ICD-10-CM

## 2024-03-25 DIAGNOSIS — Z96.652 AFTERCARE FOLLOWING JOINT REPLACEMENT SURGERY: ICD-10-CM

## 2024-03-25 DIAGNOSIS — Z96.652 PRESENCE OF LEFT ARTIFICIAL KNEE JOINT: ICD-10-CM

## 2024-03-25 PROCEDURE — 99214 OFFICE O/P EST MOD 30 MIN: CPT

## 2024-03-25 PROCEDURE — 77073 BONE LENGTH STUDIES: CPT

## 2024-03-25 PROCEDURE — 73564 X-RAY EXAM KNEE 4 OR MORE: CPT | Mod: 26,1L,50,59

## 2024-03-25 PROCEDURE — 73564 X-RAY EXAM KNEE 4 OR MORE: CPT

## 2024-03-25 PROCEDURE — 77073 BONE LENGTH STUDIES: CPT | Mod: 26

## 2024-03-26 PROBLEM — M75.81 TENDINITIS OF RIGHT ROTATOR CUFF: Status: ACTIVE | Noted: 2024-03-26

## 2024-03-26 PROBLEM — Z47.1 AFTERCARE FOLLOWING LEFT KNEE JOINT REPLACEMENT SURGERY: Status: ACTIVE | Noted: 2023-01-23

## 2024-03-26 PROBLEM — Z96.652 ARTIFICIAL KNEE JOINT PRESENT, LEFT: Status: ACTIVE | Noted: 2023-01-23

## 2024-03-26 NOTE — PHYSICAL EXAM
[de-identified] : Left Knee Exam:   Skin: Normal. No erythema, no ecchymosis, no abrasions, no scratches, no tattoos.                    Old Incisions: Healed incisions. Thin incision line.  Knee Joint Swelling: No effusion today.  Popliteal Swelling: None	 Pre-Patella Bursa Swelling: None.   Alignment: 		         neutral   Knee Joint Line Tenderness:  Not tender around the knee today. Soft Tissue Tenderness: None.   Medial Collateral Ligament Laxity:  Good endpoint.                                                        Medial opening to valgus stress.   none.  Lateral Collateral Ligament Laxity:           Normal laxity. Good endpoint.   ROM Extension: 0 degrees.    ROM Flexion: 125 degrees.     ACL Testing: 			 Stable ACL. Good Endpoint.                                                                 Lachman Test: Negative  Anterior Drawer Test: Negative   PCL Testing: 			 Stable PCL.  Good Endpoint.                                                                Posterior Drawer Test: Negative   Patella Compression Test: Negative  Patellofemoral Crepitus: None.    Patellofemoral Apprehension Testing: Negative.  Patellofemoral Laxity: Normal.  Motor Strength: 			 Quadriceps strength is 5 out of 5                                                                 Hamstring strength is 5 out of 5                                                                Ankle dorsiflexion strength is 5 out of 5                                                               Ankle plantarflexion strength is 5 out of 5   Sensation: Light tough sensation in the lower extremity is grossly normal.                                       Pulses: 				 Pulses are palpable at the ankle at the Dorsalis Pedis Artery.                                                                Pulses are palpable at the Posterior Tibialis Artery.                                                                                                                     Gait: Normal Assistive Devices: 	None.  Right Shoulder: positive [place] and Neer impingment signs. Motor strength is full with IR,ER, Abduction. No biceps tendon tenderness. radial pulse normal.   [de-identified] : X-Rays Partial Knee:  Examination of the Knee: Left  Views: AP, Lateral, Merchant, Hip to Ankle    AP Standing View: Medial Partial Knee Replacement in good position. No signs of Loosening. No subsidence. No fracture. Good insert space thickness.  Lateral View:  Medial  Partial  Knee Replacement in good position. Fix is anatomic on both femur and tibia.  [place] View:  Patellofemoral joint shows central tracking. Medial implant tracking is central on the tibial implant.  Bilateral Hip to Ankle Standing View: Knee Alignment is 4 degrees varus on both knees today.

## 2024-03-26 NOTE — HISTORY OF PRESENT ILLNESS
[de-identified] : Angelic is a 63 year old female who presents today for a follow up on left knee. She is one year out from a left medial PKA on 02.07.23. She states her left knee is feeling great. She is playing tennis again and now has a rotator cuff problem on the right. Her knee is doing really well and she is very happy with it.

## 2024-03-26 NOTE — DISCUSSION/SUMMARY
[de-identified] : Impression: 1 year s/p medial PKA left knee. Patient very happy. Right RTC tendonitis  Plan:  Right RTC supraspinatus tendonitis, PT prescription Followup 1 year for left knee with xrays.

## 2024-04-19 ENCOUNTER — NON-APPOINTMENT (OUTPATIENT)
Age: 64
End: 2024-04-19

## 2025-06-04 ENCOUNTER — APPOINTMENT (OUTPATIENT)
Dept: ORTHOPEDIC SURGERY | Facility: CLINIC | Age: 65
End: 2025-06-04

## 2025-06-04 ENCOUNTER — NON-APPOINTMENT (OUTPATIENT)
Age: 65
End: 2025-06-04

## 2025-06-04 ENCOUNTER — APPOINTMENT (OUTPATIENT)
Dept: RADIOLOGY | Facility: CLINIC | Age: 65
End: 2025-06-04

## 2025-06-04 VITALS
DIASTOLIC BLOOD PRESSURE: 86 MMHG | WEIGHT: 137 LBS | HEART RATE: 61 BPM | HEIGHT: 64 IN | BODY MASS INDEX: 23.39 KG/M2 | OXYGEN SATURATION: 97 % | RESPIRATION RATE: 18 BRPM | SYSTOLIC BLOOD PRESSURE: 144 MMHG

## 2025-06-04 PROCEDURE — 99205 OFFICE O/P NEW HI 60 MIN: CPT

## 2025-06-17 ENCOUNTER — APPOINTMENT (OUTPATIENT)
Dept: ORTHOPEDIC SURGERY | Age: 65
End: 2025-06-17
Payer: MEDICARE

## 2025-06-17 PROBLEM — M54.16 LUMBAR RADICULOPATHY, CHRONIC: Status: ACTIVE | Noted: 2025-06-17

## 2025-06-17 PROCEDURE — 99204 OFFICE O/P NEW MOD 45 MIN: CPT

## 2025-06-23 ENCOUNTER — APPOINTMENT (OUTPATIENT)
Dept: ORTHOPEDIC SURGERY | Age: 65
End: 2025-06-23

## 2025-06-27 ENCOUNTER — APPOINTMENT (OUTPATIENT)
Dept: ORTHOPEDIC SURGERY | Facility: CLINIC | Age: 65
End: 2025-06-27
Payer: MEDICARE

## 2025-06-27 VITALS
DIASTOLIC BLOOD PRESSURE: 112 MMHG | OXYGEN SATURATION: 98 % | SYSTOLIC BLOOD PRESSURE: 168 MMHG | BODY MASS INDEX: 22.88 KG/M2 | RESPIRATION RATE: 17 BRPM | HEIGHT: 64 IN | WEIGHT: 134 LBS | TEMPERATURE: 98.5 F | HEART RATE: 62 BPM

## 2025-06-27 PROCEDURE — 99214 OFFICE O/P EST MOD 30 MIN: CPT

## 2025-07-11 ENCOUNTER — APPOINTMENT (OUTPATIENT)
Dept: ORTHOPEDIC SURGERY | Facility: CLINIC | Age: 65
End: 2025-07-11
Payer: SELF-PAY

## 2025-07-11 PROCEDURE — 0232T NJX PLATELET PLASMA: CPT

## 2025-07-11 PROCEDURE — 004KIT: CUSTOM

## 2025-08-08 ENCOUNTER — APPOINTMENT (OUTPATIENT)
Dept: ORTHOPEDIC SURGERY | Facility: CLINIC | Age: 65
End: 2025-08-08
Payer: MEDICARE

## 2025-08-08 DIAGNOSIS — S76.019S STRAIN OF MUSCLE, FASCIA AND TENDON OF UNSPECIFIED HIP, SEQUELA: ICD-10-CM

## 2025-08-08 PROCEDURE — 99213 OFFICE O/P EST LOW 20 MIN: CPT | Mod: 93
